# Patient Record
Sex: MALE | Race: WHITE | NOT HISPANIC OR LATINO | Employment: FULL TIME | URBAN - METROPOLITAN AREA
[De-identification: names, ages, dates, MRNs, and addresses within clinical notes are randomized per-mention and may not be internally consistent; named-entity substitution may affect disease eponyms.]

---
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
## 2023-09-23 ENCOUNTER — OFFICE VISIT (OUTPATIENT)
Dept: URGENT CARE | Facility: CLINIC | Age: 18
End: 2023-09-23
Payer: MEDICAID

## 2023-09-23 VITALS
WEIGHT: 180 LBS | HEART RATE: 77 BPM | RESPIRATION RATE: 20 BRPM | TEMPERATURE: 98 F | HEIGHT: 73 IN | SYSTOLIC BLOOD PRESSURE: 132 MMHG | DIASTOLIC BLOOD PRESSURE: 79 MMHG | BODY MASS INDEX: 23.86 KG/M2 | OXYGEN SATURATION: 98 %

## 2023-09-23 DIAGNOSIS — R11.2 NAUSEA VOMITING AND DIARRHEA: ICD-10-CM

## 2023-09-23 DIAGNOSIS — R19.7 NAUSEA VOMITING AND DIARRHEA: ICD-10-CM

## 2023-09-23 DIAGNOSIS — K52.9 GASTROENTERITIS: Primary | ICD-10-CM

## 2023-09-23 LAB
CTP QC/QA: YES
SARS-COV-2 AG RESP QL IA.RAPID: NEGATIVE

## 2023-09-23 PROCEDURE — 87811 SARS CORONAVIRUS 2 ANTIGEN POCT, MANUAL READ: ICD-10-PCS | Mod: QW,S$GLB,, | Performed by: NURSE PRACTITIONER

## 2023-09-23 PROCEDURE — 99213 OFFICE O/P EST LOW 20 MIN: CPT | Mod: S$GLB,,, | Performed by: NURSE PRACTITIONER

## 2023-09-23 PROCEDURE — 99213 PR OFFICE/OUTPT VISIT, EST, LEVL III, 20-29 MIN: ICD-10-PCS | Mod: S$GLB,,, | Performed by: NURSE PRACTITIONER

## 2023-09-23 PROCEDURE — 87811 SARS-COV-2 COVID19 W/OPTIC: CPT | Mod: QW,S$GLB,, | Performed by: NURSE PRACTITIONER

## 2023-09-23 NOTE — PROGRESS NOTES
"Subjective:      Patient ID: Jose Currie is a 17 y.o. male.    Vitals:  height is 6' 1" (1.854 m) and weight is 81.6 kg (180 lb). His tympanic temperature is 97.8 °F (36.6 °C). His blood pressure is 132/79 and his pulse is 77. His respiration is 20 and oxygen saturation is 98%.     Chief Complaint: Emesis    Pt c/o nausea, vomiting, diarrhea and headaches x 2 days. No known exposure to illness. No meds to alleviate sxs.     Emesis   This is a new problem. The current episode started yesterday. There has been no fever. Associated symptoms include diarrhea and headaches. Risk factors include ill contacts. He has tried nothing for the symptoms.       Gastrointestinal:  Positive for vomiting and diarrhea.   Neurological:  Positive for headaches.      Objective:     Physical Exam   Constitutional: He is oriented to person, place, and time. He appears well-developed.   HENT:   Head: Normocephalic and atraumatic.   Ears:   Right Ear: External ear normal.   Left Ear: External ear normal.   Nose: Nose normal.   Mouth/Throat: Mucous membranes are normal.   Eyes: Conjunctivae and lids are normal.   Neck: Trachea normal. Neck supple.   Cardiovascular: Normal rate, regular rhythm and normal heart sounds.   Pulmonary/Chest: Effort normal and breath sounds normal. No respiratory distress.   Abdominal: Normal appearance and bowel sounds are normal. He exhibits no distension and no mass. Soft. There is no abdominal tenderness.   Musculoskeletal: Normal range of motion.         General: Normal range of motion.   Neurological: He is alert and oriented to person, place, and time. He has normal strength.   Skin: Skin is warm, dry, intact, not diaphoretic and not pale.   Psychiatric: His speech is normal and behavior is normal. Judgment and thought content normal.   Nursing note and vitals reviewed.      Assessment:     1. Gastroenteritis    2. Nausea vomiting and diarrhea        Plan:       Gastroenteritis    Nausea vomiting and " diarrhea  -     SARS Coronavirus 2 Antigen, POCT Manual Read      Results for orders placed or performed in visit on 09/23/23   SARS Coronavirus 2 Antigen, POCT Manual Read   Result Value Ref Range    SARS Coronavirus 2 Antigen Negative Negative     Acceptable Yes            Patient Instructions   INSTRUCTIONS:  - Rest.  - Drink plenty of fluids.  - Take Tylenol and/or Ibuprofen as directed as needed for fever/pain.  Do not take more than the recommended dose.  - follow up with your PCP within the next 1-2 weeks as needed.  - You must understand that you have received an Urgent Care treatment only and that you may be released before all of your medical problems are known or treated.   - You, the patient, will arrange for follow up care as instructed.   - If your condition worsens or fails to improve we recommend that you receive another evaluation at the ER immediately or contact your PCP to discuss your concerns.   - You can call [phone] or [phone] to help schedule an appointment with the appropriate provider.     -If you smoke cigarettes, it would be beneficial for you to stop.       Monitor for new or worsening symptoms

## 2023-09-23 NOTE — PATIENT INSTRUCTIONS

## 2023-09-23 NOTE — LETTER
September 23, 2023      [facility] [place] [address], [address]  [facility] [address]  Phone: [phone]  Fax: [phone]       Patient: Jose Currie   YOB: 2005  Date of Visit: 09/23/2023    To Whom It May Concern:    Sundeep Currie  was at [facility] on 09/23/2023. He may return to work on 09/24/2023 with no restrictions. Please excuse any days missed from work. If you have any questions or concerns, or if I can be of further assistance, please do not hesitate to contact me.    Sincerely,    Leydi Sen MA

## 2024-03-14 ENCOUNTER — OFFICE VISIT (OUTPATIENT)
Dept: URGENT CARE | Facility: CLINIC | Age: 19
End: 2024-03-14
Payer: MEDICAID

## 2024-03-14 VITALS
WEIGHT: 220 LBS | OXYGEN SATURATION: 98 % | RESPIRATION RATE: 18 BRPM | DIASTOLIC BLOOD PRESSURE: 90 MMHG | HEART RATE: 95 BPM | SYSTOLIC BLOOD PRESSURE: 132 MMHG | TEMPERATURE: 98 F | BODY MASS INDEX: 29.16 KG/M2 | HEIGHT: 73 IN

## 2024-03-14 DIAGNOSIS — J02.9 SORE THROAT: ICD-10-CM

## 2024-03-14 DIAGNOSIS — K22.4 ESOPHAGEAL SPASM: Primary | ICD-10-CM

## 2024-03-14 DIAGNOSIS — R09.82 POSTNASAL DRIP: ICD-10-CM

## 2024-03-14 LAB
CTP QC/QA: YES
MOLECULAR STREP A: NEGATIVE

## 2024-03-14 PROCEDURE — 99213 OFFICE O/P EST LOW 20 MIN: CPT | Mod: S$GLB,,, | Performed by: PHYSICIAN ASSISTANT

## 2024-03-14 PROCEDURE — 87651 STREP A DNA AMP PROBE: CPT | Mod: QW,S$GLB,, | Performed by: PHYSICIAN ASSISTANT

## 2024-03-14 RX ORDER — FAMOTIDINE 20 MG/1
20 TABLET, FILM COATED ORAL 2 TIMES DAILY
Qty: 60 TABLET | Refills: 0 | Status: SHIPPED | OUTPATIENT
Start: 2024-03-14 | End: 2024-04-13

## 2024-03-14 RX ORDER — AZELASTINE 1 MG/ML
1 SPRAY, METERED NASAL 2 TIMES DAILY
Qty: 30 ML | Refills: 0 | Status: SHIPPED | OUTPATIENT
Start: 2024-03-14 | End: 2025-03-14

## 2024-03-14 RX ORDER — HYOSCYAMINE SULFATE 0.12 MG/1
0.12 TABLET SUBLINGUAL 3 TIMES DAILY
Qty: 21 TABLET | Refills: 0 | Status: SHIPPED | OUTPATIENT
Start: 2024-03-14 | End: 2024-03-21

## 2024-03-14 NOTE — PATIENT INSTRUCTIONS

## 2024-03-14 NOTE — PROGRESS NOTES
"Subjective:      Patient ID: Jose Currie is a 18 y.o. male.    Vitals:  height is 6' 1" (1.854 m) and weight is 99.8 kg (220 lb). His oral temperature is 98.1 °F (36.7 °C). His blood pressure is 132/90 (abnormal) and his pulse is 95. His respiration is 18 and oxygen saturation is 98%.     Chief Complaint: Sore Throat    Patient reports sore throat for the last 2 weeks. Feels swollen. Like something is stuck, especially in morning.  He reports more pain on the right side and some swelling. He reports pain 5/10. He take dayquil with mild relief.     Sore Throat   This is a new problem. The current episode started 1 to 4 weeks ago. The problem has been unchanged. The pain is worse on the right side. There has been no fever. The fever has been present for Less than 1 day. The pain is at a severity of 5/10. Associated symptoms include congestion and trouble swallowing. Pertinent negatives include no abdominal pain, coughing, diarrhea, drooling, ear pain, headaches, neck pain, shortness of breath, stridor or vomiting.       Constitution: Negative for chills, sweating, fatigue and fever.   HENT:  Positive for congestion, sore throat and trouble swallowing. Negative for ear pain, drooling and voice change.    Neck: Negative for neck pain, neck stiffness and painful lymph nodes.   Cardiovascular:  Negative for chest pain, leg swelling, palpitations, sob on exertion and passing out.   Eyes:  Negative for eye discharge, eye itching, eye pain, eye redness and eyelid swelling.   Respiratory:  Negative for chest tightness, cough, sputum production, bloody sputum, shortness of breath, stridor and wheezing.    Gastrointestinal:  Negative for abdominal pain, abdominal bloating, nausea, vomiting, constipation, diarrhea and heartburn.   Genitourinary:  Negative for urine decreased.   Musculoskeletal:  Negative for joint pain, joint swelling, abnormal ROM of joint, pain with walking, muscle cramps and muscle ache.   Skin:  Negative " for rash and hives.   Allergic/Immunologic: Negative for hives, itching and sneezing.   Neurological:  Negative for dizziness, light-headedness, passing out, loss of balance, headaches, altered mental status, loss of consciousness, numbness and seizures.   Hematologic/Lymphatic: Negative for swollen lymph nodes.   Psychiatric/Behavioral:  Negative for altered mental status and nervous/anxious. The patient is not nervous/anxious.       Objective:     Physical Exam   Constitutional: He is oriented to person, place, and time. He appears well-developed. He is cooperative.  Non-toxic appearance. He does not appear ill. No distress.   HENT:   Head: Normocephalic and atraumatic.   Ears:   Right Ear: Hearing, tympanic membrane, external ear and ear canal normal.   Left Ear: Hearing, tympanic membrane, external ear and ear canal normal.   Nose: Mucosal edema and rhinorrhea present. No nasal deformity. No epistaxis. Right sinus exhibits no maxillary sinus tenderness and no frontal sinus tenderness. Left sinus exhibits no maxillary sinus tenderness and no frontal sinus tenderness.   Mouth/Throat: Uvula is midline and mucous membranes are normal. No trismus in the jaw. Normal dentition. No uvula swelling. Posterior oropharyngeal erythema and cobblestoning present. No oropharyngeal exudate, posterior oropharyngeal edema or tonsillar abscesses. No tonsillar exudate.   Eyes: Conjunctivae and lids are normal. No scleral icterus (hyo).   Neck: Trachea normal and phonation normal. Neck supple. No edema present. No erythema present. No neck rigidity present.   Cardiovascular: Normal rate, regular rhythm, normal heart sounds and normal pulses.   Pulmonary/Chest: Effort normal and breath sounds normal. No accessory muscle usage or stridor. No respiratory distress. He has no decreased breath sounds. He has no wheezes. He has no rhonchi. He has no rales.   Abdominal: Normal appearance.   Musculoskeletal: Normal range of motion.          General: No deformity. Normal range of motion.   Lymphadenopathy:     He has no cervical adenopathy.   Neurological: He is alert and oriented to person, place, and time. He has normal sensation. He exhibits normal muscle tone. Gait normal. Coordination normal.   Skin: Skin is warm, dry, intact, not diaphoretic, not pale and no rash. Capillary refill takes less than 2 seconds.   Psychiatric: His speech is normal and behavior is normal. Judgment and thought content normal.   Nursing note and vitals reviewed.      Assessment:     1. Esophageal spasm    2. Sore throat    3. Postnasal drip        Plan:   Can not rule out reflux related esophageal spasm. Because dayquil gave some relief will rx nasal spray to help with Pnd. Pt advised to start with pepcid and tums before starting hyocosamine. Follow up with PCP for GI consult if necessary.    Esophageal spasm    Sore throat  -     POCT Strep A, Molecular    Postnasal drip    Other orders  -     famotidine (PEPCID) 20 MG tablet; Take 1 tablet (20 mg total) by mouth 2 (two) times daily.  Dispense: 60 tablet; Refill: 0  -     azelastine (ASTELIN) 137 mcg (0.1 %) nasal spray; 1 spray (137 mcg total) by Nasal route 2 (two) times daily.  Dispense: 30 mL; Refill: 0  -     hyoscyamine (LEVSIN) 0.125 mg Subl; Place 1 tablet (0.125 mg total) under the tongue 3 (three) times daily. for 7 days  Dispense: 21 tablet; Refill: 0      Patient Instructions   INSTRUCTIONS:  - Rest.  - Drink plenty of fluids.  - Take Tylenol and/or Ibuprofen as directed as needed for fever/pain.  Do not take more than the recommended dose.  - follow up with your PCP within the next 1-2 weeks as needed.  - You must understand that you have received an Urgent Care treatment only and that you may be released before all of your medical problems are known or treated.   - You, the patient, will arrange for follow up care as instructed.   - If your condition worsens or fails to improve we recommend that you receive  another evaluation at the ER immediately or contact your PCP to discuss your concerns.   - You can call [phone] or [phone] to help schedule an appointment with the appropriate provider.     -If you smoke cigarettes, it would be beneficial for you to stop.

## 2024-07-19 ENCOUNTER — OFFICE VISIT (OUTPATIENT)
Dept: URGENT CARE | Facility: CLINIC | Age: 19
End: 2024-07-19
Payer: MEDICAID

## 2024-07-19 VITALS
BODY MASS INDEX: 29.16 KG/M2 | HEART RATE: 81 BPM | DIASTOLIC BLOOD PRESSURE: 69 MMHG | OXYGEN SATURATION: 99 % | HEIGHT: 73 IN | SYSTOLIC BLOOD PRESSURE: 136 MMHG | RESPIRATION RATE: 20 BRPM | TEMPERATURE: 99 F | WEIGHT: 220 LBS

## 2024-07-19 DIAGNOSIS — K52.9 GASTROENTERITIS: Primary | ICD-10-CM

## 2024-07-19 PROCEDURE — 99213 OFFICE O/P EST LOW 20 MIN: CPT | Mod: S$GLB,,, | Performed by: PHYSICIAN ASSISTANT

## 2024-07-19 RX ORDER — ONDANSETRON 4 MG/1
4 TABLET, ORALLY DISINTEGRATING ORAL EVERY 8 HOURS PRN
Qty: 30 TABLET | Refills: 0 | Status: SHIPPED | OUTPATIENT
Start: 2024-07-19

## 2024-07-19 NOTE — LETTER
July 19, 2024      [facility] [address]  [facility] [address]  Phone: [phone]  Fax: [phone]       Patient: Jose Currie   YOB: 2005  Date of Visit: 07/19/2024    To Whom It May Concern:    Sundeep Currie  was at [facility] on 07/19/2024. Please excuse any missed days. If you have any questions or concerns, or if I can be of further assistance, please do not hesitate to contact me.    Sincerely,    Minnie Winslow MA

## 2024-07-19 NOTE — PATIENT INSTRUCTIONS

## 2024-07-19 NOTE — LETTER
July 19, 2024      [facility] [address] , [address]  [facility] [address]  Phone: [phone]  Fax: [phone]       Patient: Jose Currie   YOB: 2005  Date of Visit: 07/19/2024    To Whom It May Concern:    Sundeep Currie  was at [facility] on 07/19/2024.  may return to work/school on  If you have any questions or concerns, or if I can be of further assistance, please do not hesitate to contact me.    Sincerely,    Minnie Winslow MA

## 2024-07-19 NOTE — PROGRESS NOTES
"Subjective:      Patient ID: Jose Currie is a 18 y.o. male.    Vitals:  height is 6' 1" (1.854 m) and weight is 99.8 kg (220 lb). His oral temperature is 98.5 °F (36.9 °C). His blood pressure is 136/69 and his pulse is 81. His respiration is 20 and oxygen saturation is 99%.     Chief Complaint: Diarrhea    Pt presents to  diarrhea vomiting and headaches, pt states that his family contracted a stomach virus onset Tuesday, pt states that he is feeling a lot better but needs a note to go back to work    Diarrhea   Associated symptoms include sweats and vomiting. Pertinent negatives include no abdominal pain, arthralgias, chills, coughing, fever, headaches or myalgias.   Emesis   This is a new problem. The current episode started in the past 7 days. The problem occurs less than 2 times per day. The problem has been resolved. The emesis has an appearance of stomach contents. There has been no fever. Associated symptoms include diarrhea and sweats. Pertinent negatives include no abdominal pain, arthralgias, chest pain, chills, coughing, decreased urine volume, dizziness, fever, headaches or myalgias. He has tried nothing for the symptoms.     Constitution: Negative for chills, sweating, fatigue and fever.   HENT:  Negative for ear pain, drooling, congestion, sore throat, trouble swallowing and voice change.    Neck: Negative for neck pain, neck stiffness and painful lymph nodes.   Cardiovascular:  Negative for chest pain, leg swelling, palpitations, sob on exertion and passing out.   Eyes:  Negative for eye discharge, eye itching, eye pain, eye redness and eyelid swelling.   Respiratory:  Negative for chest tightness, cough, sputum production, bloody sputum, shortness of breath, stridor and wheezing.    Gastrointestinal:  Positive for nausea, vomiting and diarrhea. Negative for abdominal pain, abdominal bloating, constipation and heartburn.   Genitourinary:  Negative for urine decreased.   Musculoskeletal:  Negative " for joint pain, joint swelling, abnormal ROM of joint, pain with walking, muscle cramps and muscle ache.   Skin:  Negative for rash and hives.   Allergic/Immunologic: Negative for hives, itching and sneezing.   Neurological:  Negative for dizziness, light-headedness, passing out, loss of balance, headaches, altered mental status, loss of consciousness, numbness and seizures.   Hematologic/Lymphatic: Negative for swollen lymph nodes.   Psychiatric/Behavioral:  Negative for altered mental status and nervous/anxious. The patient is not nervous/anxious.       Objective:     Physical Exam   Constitutional: He is oriented to person, place, and time. He appears well-developed. He is cooperative.   HENT:   Head: Normocephalic and atraumatic.   Ears:   Right Ear: Hearing, tympanic membrane, external ear and ear canal normal.   Left Ear: Hearing, tympanic membrane, external ear and ear canal normal.   Nose: Nose normal. No mucosal edema or nasal deformity. No epistaxis. Right sinus exhibits no maxillary sinus tenderness and no frontal sinus tenderness. Left sinus exhibits no maxillary sinus tenderness and no frontal sinus tenderness.   Mouth/Throat: Uvula is midline, oropharynx is clear and moist and mucous membranes are normal. No trismus in the jaw. Normal dentition. No uvula swelling.   Eyes: Conjunctivae and lids are normal.   Neck: Trachea normal and phonation normal. Neck supple.   Cardiovascular: Normal rate, regular rhythm, normal heart sounds and normal pulses.   Pulmonary/Chest: Effort normal and breath sounds normal.   Abdominal: Normal appearance and bowel sounds are normal. Soft.   Musculoskeletal: Normal range of motion.         General: Normal range of motion.   Neurological: He is alert and oriented to person, place, and time. He exhibits normal muscle tone.   Skin: Skin is warm, dry and intact.   Psychiatric: His speech is normal and behavior is normal. Judgment and thought content normal.   Nursing note and  vitals reviewed.      Assessment:     1. Gastroenteritis        Plan:       Gastroenteritis    Other orders  -     ondansetron (ZOFRAN-ODT) 4 MG TbDL; Take 1 tablet (4 mg total) by mouth every 8 (eight) hours as needed (nausea).  Dispense: 30 tablet; Refill: 0      Patient Instructions   INSTRUCTIONS:  - Rest.  - Drink plenty of fluids.  - Take Tylenol and/or Ibuprofen as directed as needed for fever/pain.  Do not take more than the recommended dose.  - follow up with your PCP within the next 1-2 weeks as needed.  - You must understand that you have received an Urgent Care treatment only and that you may be released before all of your medical problems are known or treated.   - You, the patient, will arrange for follow up care as instructed.   - If your condition worsens or fails to improve we recommend that you receive another evaluation at the ER immediately or contact your PCP to discuss your concerns.   - You can call [phone] or [phone] to help schedule an appointment with the appropriate provider.     -If you smoke cigarettes, it would be beneficial for you to stop.

## 2024-12-04 ENCOUNTER — OFFICE VISIT (OUTPATIENT)
Dept: URGENT CARE | Facility: CLINIC | Age: 19
End: 2024-12-04
Payer: MEDICAID

## 2024-12-04 VITALS
RESPIRATION RATE: 18 BRPM | SYSTOLIC BLOOD PRESSURE: 130 MMHG | WEIGHT: 220 LBS | HEIGHT: 73 IN | BODY MASS INDEX: 29.16 KG/M2 | OXYGEN SATURATION: 99 % | TEMPERATURE: 98 F | HEART RATE: 88 BPM | DIASTOLIC BLOOD PRESSURE: 84 MMHG

## 2024-12-04 DIAGNOSIS — R50.9 FEVER, UNSPECIFIED FEVER CAUSE: Primary | ICD-10-CM

## 2024-12-04 DIAGNOSIS — J02.9 SORE THROAT: ICD-10-CM

## 2024-12-04 LAB
CTP QC/QA: YES
CTP QC/QA: YES
MOLECULAR STREP A: NEGATIVE
POC MOLECULAR INFLUENZA A AGN: NEGATIVE
POC MOLECULAR INFLUENZA B AGN: NEGATIVE

## 2024-12-04 PROCEDURE — 87651 STREP A DNA AMP PROBE: CPT | Mod: QW,S$GLB,, | Performed by: PHYSICIAN ASSISTANT

## 2024-12-04 PROCEDURE — 87502 INFLUENZA DNA AMP PROBE: CPT | Mod: QW,S$GLB,, | Performed by: PHYSICIAN ASSISTANT

## 2024-12-04 PROCEDURE — 99213 OFFICE O/P EST LOW 20 MIN: CPT | Mod: S$GLB,,, | Performed by: PHYSICIAN ASSISTANT

## 2024-12-04 NOTE — PATIENT INSTRUCTIONS
If you were prescribed a narcotic or controlled medication, do not drive or operate heavy equipment or machinery while taking these medications.  You must understand that you've received an Urgent Care treatment only and that you may be released before all your medical problems are known or treated. You, the patient, will arrange for follow up care as instructed.  Follow up with your PCP or specialty clinic as directed if not improved or as needed. You can call [phone] to schedule an appointment with the appropriate provider.  If your condition worsens we recommend that you receive another evaluation at the Emergency Department for any concerns or worsening of condition.  Patient aware and verbalized understanding.    Reviewed test results with patient.  Increase fluids: Cool liquids as much as possible.   Avoid any foods or beverages that may cause irritation to the throat (spicy, acidic, rough, etc.).  Rest is important.  Avoid contact with sick individuals.  Humidifier use at home.  THROW AWAY TOOTHBRUSH AND START WITH NEW ONE.  AVOID SHARING FOOD/DRINK AS DISCUSSED.   Can take OTC Claritin or Zyrtec or Allegra (plain) daily as needed for seasonal allergies/nasal congestion, etc.  Can take OTC Flonase Nasal [place] as needed for nasal congestion/seasonal allergies, etc.  Can take OTC Tylenol or Motrin UNLESS CONTRAINDICATED (liver and/or kidney issues, etc.) every 4 - 6 hours as needed for fever or pain, etc.  Follow-up with your PCP in the next 24-72hrs or sooner for re-evaluation especially if no improvement in symptoms.  ER precautions given to patient.  Patient aware, verbalized understanding and agreed with plan of care.

## 2024-12-04 NOTE — PROGRESS NOTES
"Subjective:      Patient ID: Jose Currie is a 19 y.o. male.    Vitals:  height is 6' 1" (1.854 m) and weight is 99.8 kg (220 lb). His oral temperature is 98.4 °F (36.9 °C). His blood pressure is 130/84 and his pulse is 88. His respiration is 18 and oxygen saturation is 99%.     Chief Complaint: Fever    Fever   This is a new problem. The current episode started 2 days ago. The problem has been resolved. He has not experienced a heat injury.The maximum temperature noted was 103 to 103.9 F. The temperature was taken using an oral thermometer. Associated symptoms include congestion, coughing, headaches, muscle aches, nausea, a sore throat and vomiting. Pertinent negatives include no abdominal pain, chest pain, diarrhea, ear pain, not playful when afebrile, rash, sleepiness, urinary pain or wheezing. He has tried acetaminophen and NSAIDs for the symptoms. The treatment provided significant relief.       Constitution: Positive for fever. Negative for chills, sweating and fatigue.   HENT:  Positive for congestion, postnasal drip, sinus pressure and sore throat. Negative for ear pain, drooling, nosebleeds, foreign body in nose, sinus pain, trouble swallowing and voice change.    Neck: Negative for neck pain, neck stiffness, painful lymph nodes and neck swelling.   Cardiovascular:  Negative for chest pain, leg swelling, palpitations, sob on exertion and passing out.   Eyes:  Negative for eye discharge, eye itching, eye pain, eye redness and eyelid swelling.   Respiratory:  Positive for cough. Negative for chest tightness, sputum production, bloody sputum, shortness of breath, stridor and wheezing.    Gastrointestinal:  Positive for nausea and vomiting. Negative for abdominal pain, abdominal bloating, constipation, diarrhea and heartburn.   Genitourinary:  Negative for dysuria, frequency, urgency, flank pain and hematuria.   Musculoskeletal:  Positive for muscle ache. Negative for joint pain, joint swelling, abnormal ROM " of joint, back pain, pain with walking and muscle cramps.   Skin:  Negative for rash and hives.   Allergic/Immunologic: Negative for hives, itching and sneezing.   Neurological:  Positive for headaches. Negative for dizziness, light-headedness, passing out, facial drooping, speech difficulty, loss of balance, altered mental status, loss of consciousness, numbness and seizures.   Hematologic/Lymphatic: Negative for swollen lymph nodes.   Psychiatric/Behavioral:  Negative for altered mental status and nervous/anxious. The patient is not nervous/anxious.       Objective:     Physical Exam   Constitutional: He is oriented to person, place, and time. He appears well-developed. He is cooperative.  Non-toxic appearance. He does not appear ill. No distress.   HENT:   Head: Normocephalic and atraumatic.   Ears:   Right Ear: Hearing, tympanic membrane, external ear and ear canal normal.   Left Ear: Hearing, tympanic membrane, external ear and ear canal normal.   Nose: Mucosal edema and rhinorrhea present. No nasal deformity. No epistaxis. Right sinus exhibits no maxillary sinus tenderness and no frontal sinus tenderness. Left sinus exhibits no maxillary sinus tenderness and no frontal sinus tenderness.   Mouth/Throat: Uvula is midline and mucous membranes are normal. No trismus in the jaw. Normal dentition. No uvula swelling. Posterior oropharyngeal erythema and cobblestoning present. No oropharyngeal exudate, posterior oropharyngeal edema or tonsillar abscesses. No tonsillar exudate.   Eyes: Conjunctivae and lids are normal. No scleral icterus.   Neck: Trachea normal and phonation normal. Neck supple. No edema present. No erythema present. No neck rigidity present.   Cardiovascular: Normal rate, regular rhythm, normal heart sounds and normal pulses.   Pulmonary/Chest: Effort normal and breath sounds normal. No accessory muscle usage or stridor. No respiratory distress. He has no decreased breath sounds. He has no wheezes. He  has no rhonchi. He has no rales.   Abdominal: Normal appearance and bowel sounds are normal. He exhibits no distension and no mass. Soft. There is no abdominal tenderness. There is no rebound, no guarding, no left CVA tenderness and no right CVA tenderness.   Musculoskeletal: Normal range of motion.         General: No deformity. Normal range of motion.   Lymphadenopathy:     He has no cervical adenopathy.   Neurological: He is alert and oriented to person, place, and time. He has normal sensation and normal strength. He exhibits normal muscle tone. Gait normal. Coordination normal.   Skin: Skin is warm, dry, intact, not diaphoretic, not pale and no rash. Capillary refill takes less than 2 seconds.   Psychiatric: His speech is normal and behavior is normal. Judgment and thought content normal.   Nursing note and vitals reviewed.      Results for orders placed or performed in visit on 12/04/24   POCT Strep A, Molecular    Collection Time: 12/04/24  2:03 PM   Result Value Ref Range    Molecular Strep A, POC Negative Negative     Acceptable Yes    POCT Influenza A/B MOLECULAR    Collection Time: 12/04/24  2:12 PM   Result Value Ref Range    POC Molecular Influenza A Ag Negative Negative    POC Molecular Influenza B Ag Negative Negative     Acceptable Yes        Assessment:     1. Fever, unspecified fever cause    2. Sore throat      Plan:   Discussed test results done in clinic today with patient. Advised close follow-up with PCP and/or Specialist for further evaluation as needed. ER precautions given to patient as well. Patient aware, verbalized understanding and agreed with plan of care.    Fever, unspecified fever cause  -     POCT Influenza A/B MOLECULAR  -     POCT Strep A, Molecular    Sore throat  -     POCT Strep A, Molecular      Patient Instructions   If you were prescribed a narcotic or controlled medication, do not drive or operate heavy equipment or machinery while taking  these medications.  You must understand that you've received an Urgent Care treatment only and that you may be released before all your medical problems are known or treated. You, the patient, will arrange for follow up care as instructed.  Follow up with your PCP or specialty clinic as directed if not improved or as needed. You can call [phone] to schedule an appointment with the appropriate provider.  If your condition worsens we recommend that you receive another evaluation at the Emergency Department for any concerns or worsening of condition.  Patient aware and verbalized understanding.    Reviewed test results with patient.  Increase fluids: Cool liquids as much as possible.   Avoid any foods or beverages that may cause irritation to the throat (spicy, acidic, rough, etc.).  Rest is important.  Avoid contact with sick individuals.  Humidifier use at home.  THROW AWAY TOOTHBRUSH AND START WITH NEW ONE.  AVOID SHARING FOOD/DRINK AS DISCUSSED.   Can take OTC Claritin or Zyrtec or Allegra (plain) daily as needed for seasonal allergies/nasal congestion, etc.  Can take OTC Flonase Nasal [place] as needed for nasal congestion/seasonal allergies, etc.  Can take OTC Tylenol or Motrin UNLESS CONTRAINDICATED (liver and/or kidney issues, etc.) every 4 - 6 hours as needed for fever or pain, etc.  Follow-up with your PCP in the next 24-72hrs or sooner for re-evaluation especially if no improvement in symptoms.  ER precautions given to patient.  Patient aware, verbalized understanding and agreed with plan of care.

## 2024-12-04 NOTE — LETTER
December 4, 2024      [facility]  [address] , [address]  [place] [address]  Phone: [phone]  Fax: [phone]       Patient: Jose Currie   YOB: 2005  Date of Visit: 12/04/2024    To Whom It May Concern:    Sundeep Currie  was at [facility] on 12/04/2024. Please excuse patient for days missed from work/school. The patient may return to work/school after patient is at least 24 hours fever-free without the use of fever-reducing medications and improvement of symptoms. If you have any questions or concerns, or if I can be of further assistance, please do not hesitate to contact me.    Sincerely,      Saray Arroyo PA-C